# Patient Record
Sex: MALE | Race: WHITE | Employment: OTHER | URBAN - METROPOLITAN AREA
[De-identification: names, ages, dates, MRNs, and addresses within clinical notes are randomized per-mention and may not be internally consistent; named-entity substitution may affect disease eponyms.]

---

## 2022-04-08 ENCOUNTER — OFFICE VISIT (OUTPATIENT)
Dept: ORTHOPEDIC SURGERY | Age: 62
End: 2022-04-08
Payer: COMMERCIAL

## 2022-04-08 VITALS — BODY MASS INDEX: 29.66 KG/M2 | HEIGHT: 67 IN | WEIGHT: 189 LBS

## 2022-04-08 DIAGNOSIS — S46.012A STRAIN OF LEFT ROTATOR CUFF CAPSULE, INITIAL ENCOUNTER: ICD-10-CM

## 2022-04-08 DIAGNOSIS — M25.512 ACUTE PAIN OF LEFT SHOULDER: Primary | ICD-10-CM

## 2022-04-08 PROCEDURE — 99203 OFFICE O/P NEW LOW 30 MIN: CPT | Performed by: PHYSICIAN ASSISTANT

## 2022-04-08 PROCEDURE — L3660 SO 8 AB RSTR CAN/WEB PRE OTS: HCPCS | Performed by: PHYSICIAN ASSISTANT

## 2022-04-08 NOTE — PROGRESS NOTES
Chief Complaint    No chief complaint on file. History of Present Illness:  Angela Menchaca is a 64 y.o. male who presents tonight for evaluation of left shoulder injury. Patient states that last night he fell in his home landing on his outstretched left and right elbows. He had immediate pain within the left shoulder. Since the injury he has had decreased range of motion of the left upper extremity secondary to pain. Patient has a history of neuropathy and spinal issues. Patient states that the pain is mainly over the anterior superior aspect the left shoulder and increased with any range of motion. He does have neuropathy that radiates into his left hand which he states has been unchanged since the recent injury. Patient is on chronic pain medication secondary to ongoing spinal issues  Pain Assessment  Location of Pain: Shoulder  Location Modifiers: Left  Severity of Pain: 10  Quality of Pain: Aching,Sharp  Duration of Pain: Persistent  Frequency of Pain: Constant  Date Pain First Started: 04/07/22  Limiting Behavior: Yes  Result of Injury: Yes  Work-Related Injury: No  Are there other pain locations you wish to document?: No]       Medical History:  Patient's medications, allergies, past medical, surgical, social and family histories were reviewed and updated as appropriate. Review of Systems:  Relevant 12 point review of systems dated 4/8/2022 was reviewed and are available in the patient's chart under the media tab. Vital Signs: There were no vitals taken for this visit. General Exam:   Constitutional: Patient is adequately groomed with no evidence of malnutrition  DTRs: Deep tendon reflexes are intact  Mental Status: The patient is oriented to time, place and person. The patient's mood and affect are appropriate. Lymphatic: The lymphatic examination bilaterally reveals all areas to be without enlargement or induration. Vascular: Examination reveals no swelling or calf tenderness. Peripheral pulses are palpable and 2+. Neurological: The patient has adequate coordination. There is mild weakness of his left upper extremity     Left shoulder Examination:    Inspection:  No rashes, scars, or lesions. No deformity or atrophy. Palpation: Patient is diffusely tender to palpation over the anterior aspect of the left shoulder into the subacromial space. Is also tender to palpation over the [facility] joint. Range of Motion: 0 degrees of extension 90 degrees of forward flexion, 90 degrees of abduction, internal rotation only to his waist and very limited external rotation secondary to pain. Strength: Left. Biceps and triceps strength is 4/5. Right biceps strength is noted to be 5/5. Special Tests:  Positive Rosas and Neer impingement exam.  Negative speed sign. Negative crossover examination. Skin: There are no rashes, ulcerations or lesions. Gait: Antalgic      Additional Examinations:         Contralateral Exam: Examination of the right shoulder reveals no atrophy or deformity. Skin is warm and dry. Range of motion is within normal limits. There is no focal tenderness with palpation. No AC joint tenderness. Negative Neer and Rosas-Jose exams. Strength is graded 5/5 throughout. Neck: Examination of the neck does not show any tenderness, deformity or injury. Range of motion is unremarkable. There is no gross instability. There are no rashes, ulcerations or lesions. Strength and tone are normal.    Radiology:     X-rays obtained and reviewed in office:  Views 4 views including AP, Y, axillary, and internal rotation view  Location left shoulder  Impression there is a well-maintained glenohumeral joint with mild arthritic changes. No fractures or dislocations. Impression: Left rotator cuff strain with possible rotator cuff tear  Encounter Diagnosis   Name Primary?     Acute pain of left shoulder Yes       Office Procedures:  Orders Placed This Encounter   Procedures  XR SHOULDER LEFT (MIN 2 VIEWS)     Standing Status:   Future     Number of Occurrences:   1     Standing Expiration Date:   4/8/2023       Treatment Plan: Tonight we discussed the diagnosis and treatment options and the patient was placed into a sling for his comfort. He is to continue icing the left shoulder and he will follow-up next week with Dr. Sukhdeep Perez and [facility] ([facility]) for his continue evaluation care.     Maykel Pires PA-C  Board certified by the [facility] [facility]

## 2022-04-12 ENCOUNTER — OFFICE VISIT (OUTPATIENT)
Dept: ORTHOPEDIC SURGERY | Age: 62
End: 2022-04-12
Payer: COMMERCIAL

## 2022-04-12 DIAGNOSIS — S46.012A STRAIN OF LEFT ROTATOR CUFF CAPSULE, INITIAL ENCOUNTER: Primary | ICD-10-CM

## 2022-04-12 PROCEDURE — 99203 OFFICE O/P NEW LOW 30 MIN: CPT | Performed by: STUDENT IN AN ORGANIZED HEALTH CARE EDUCATION/TRAINING PROGRAM

## 2022-04-12 NOTE — PROGRESS NOTES
Date:  2022    Name:  Ruben De Jesus  Address:  [address] [place] [id number]    :  1960      Age:   64 y.o.    SSN:  [id number]      Medical Record Number:  [id number]    Reason for Visit:    Chief Complaint    Shoulder Pain (left shoulder pain; fell last Thursday; pain; Limited ROM)      DOS:2022     HPI: Ruben De Jesus is a right-hand-dominant 64 y.o. male here today for new patient evaluation regarding his left shoulder. The patient reports that he fell last week directly onto both of his arms. He noted immediate pain and weakness to the left shoulder. Over the past few days he has not gotten any better and continues to have weakness of the left shoulder to move it or to lift even light things. He denies numbness and tingling down the arm from the injury but he does have a history of neuropathy in his legs and his feet from a combination of multiple back surgeries as well as diabetes. He reports his diabetes is well controlled. He does not take any medications for his diabetes currently. The patient is on chronic pain management for his multiple back surgeries, including Percocet and diazepam.      Pain Assessment  Location of Pain: Shoulder  Location Modifiers: Left  Quality of Pain: Dull  Duration of Pain: Persistent  Frequency of Pain: Constant  Limiting Behavior: Yes  Relieving Factors: Rest  Result of Injury: Yes  Work-Related Injury: No  ROS: All systems reviewed on patient intake form. Pertinent items are noted in HPI. History reviewed. No pertinent past medical history. History reviewed. No pertinent surgical history. History reviewed. No pertinent family history.     Social History     Socioeconomic History    Marital status:      Spouse name: None    Number of children: None    Years of education: None    Highest education level: None   Occupational History    None   Tobacco Use    Smoking status: None    Smokeless tobacco: None   Substance and Sexual Activity    Alcohol use: None    Drug use: None    Sexual activity: None   Other Topics Concern    None   Social History Narrative    None     Social Determinants of Health     Financial Resource Strain:     Difficulty of Paying Living Expenses: Not on file   Food Insecurity:     Worried About Running Out of Food in the Last Year: Not on file    [place] of Food in the Last Year: Not on file   Transportation Needs:     Lack of Transportation (Medical): Not on file    Lack of Transportation (Non-Medical): Not on file   Physical Activity:     Days of Exercise per Week: Not on file    Minutes of Exercise per Session: Not on file   Stress:     Feeling of Stress : Not on file   Social Connections:     Frequency of Communication with Friends and Family: Not on file    Frequency of Social Gatherings with Friends and Family: Not on file    Attends [employer]ology Services: Not on file    Active Member of [address] [place] [address] [employer] or Organizations: Not on file    Attends Club or Organization Meetings: Not on file    Marital Status: Not on file   Intimate Partner Violence:     Fear of Current or Ex-Partner: Not on file    Emotionally Abused: Not on file    Physically Abused: Not on file    Sexually Abused: Not on file   Housing Stability:     Unable to Pay for Housing in the Last Year: Not on file    Number of [place] in the Last Year: Not on file    Unstable Housing in the Last Year: Not on file       No current outpatient medications on file. No current facility-administered medications for this visit. No Known Allergies    Vital signs: There were no vitals taken for this visit. Left shoulder exam    Inspection:  Held in a normal posture. Normal contour at the acromioclavicular joint. No swelling, ecchymosis, or erythema about the shoulder. No atrophy appreciated. No scapular winging. Palpation:  No subacromial crepitus. No tenderness of the AC joint.  No greater tuberosity tenderness. No tenderness in the bicipital groove. Range of Motion: Active forward flexion to 125 degrees, passively to 150 degrees. External rotation actively 15 degrees, passively to 45 degrees. Internal rotation to back pocket. Strength: 3 out of 5 supraspinatus strength, 4 out of 5 infraspinatus strength, 2 out of 5 subscapularis strength on belly press and lift off. Stability: No anterior instability. No posterior instability. Special Tests: Impingement findings are positive. Labral findings are negative. Speed sign and Yergason signs are both positive. Crossover sign is negative. Other findings: The skin is warm dry and well perfused. 2+ radial pulse. Sensation is intact to light touch over the deltoid. Right comparison shoulder exam    Inspection:  Held in a normal posture. Normal contour at the acromioclavicular joint. No swelling, ecchymosis, or erythema about the shoulder. No atrophy appreciated. No scapular winging. Palpation:  No subacromial crepitus. No tenderness of the AC joint. No greater tuberosity tenderness. No tenderness in the bicipital groove. Range of Motion: Full passive and active ROM. Normal scapulothoracic rhythm. Strength:  Normal supraspinatus, infraspinatus, and subscapularis muscle strength. Stability: No anterior instability. No posterior instability. Special Tests: Impingement findings are negative. Labral findings are negative. Speed sign and Yergason signs are both negative. Crossover sign is negative. Belly press sign is negative. Lift off sign is negative. Other findings: The skin is warm dry and well perfused. 2+ radial pulse. Sensation is intact to light touch over the deltoid. Diagnostics:  Radiology:       No new x-rays today.   Prior x-rays from the after-hours visit demonstrate slight arthritis of the glenohumeral joint, moderate to severe AC joint arthritis, but appropriate acromiohumeral interval and glenohumeral alignment      Assessment: 64 y.o. male with acute left shoulder pain after a fall and profound rotator cuff weakness, concern for acute rotator cuff tear    Plan: Pertinent imaging was reviewed. The etiology, natural history, and treatment options for the disorder were discussed. The roles of activity medication, antiinflammatories, injections, bracing, physical therapy, and surgical interventions were all described to the patient and questions were answered. Given the patient's recent history of injury and trauma from a fall, coupled with his physical exam findings of global weakness to his rotator cuff muscles especially subscapularis and supraspinatus, I am concerned that the patient has an acute full-thickness rotator cuff tear. I will get the patient ordered an MRI to evaluate the rotator cuff tendons. Follow-up with me after the MRI to go over the results. . Aditya Dumas is in agreement with this plan. All questions were answered to patient's satisfaction and was encouraged to call with any further questions. Nga Owen, DO  Orthopedic Surgery and Sports Medicine  4/12/2022    Orders Placed This Encounter   Procedures    MRI SHOULDER LEFT WO CONTRAST     Standing Status:   Future     Standing Expiration Date:   4/12/2023     Scheduling Instructions:      RACHELE FAUSTIN            R/O RTC TEAR.

## 2022-04-13 ENCOUNTER — TELEPHONE (OUTPATIENT)
Dept: ORTHOPEDIC SURGERY | Age: 62
End: 2022-04-13

## 2022-04-20 ENCOUNTER — HOSPITAL ENCOUNTER (OUTPATIENT)
Dept: MRI IMAGING | Age: 62
Discharge: HOME | End: 2022-04-20
Payer: COMMERCIAL

## 2022-04-20 DIAGNOSIS — S46.012A STRAIN OF LEFT ROTATOR CUFF CAPSULE, INITIAL ENCOUNTER: ICD-10-CM

## 2022-04-20 PROCEDURE — 73221 MRI JOINT UPR EXTREM W/O DYE: CPT

## 2022-05-03 ENCOUNTER — OFFICE VISIT (OUTPATIENT)
Dept: ORTHOPEDIC SURGERY | Age: 62
End: 2022-05-03
Payer: COMMERCIAL

## 2022-05-03 DIAGNOSIS — M75.22 BICEPS TENDINITIS OF LEFT UPPER EXTREMITY: ICD-10-CM

## 2022-05-03 DIAGNOSIS — S46.012A TRAUMATIC COMPLETE TEAR OF LEFT ROTATOR CUFF, INITIAL ENCOUNTER: Primary | ICD-10-CM

## 2022-05-03 DIAGNOSIS — M19.019 OSTEOARTHRITIS OF AC (ACROMIOCLAVICULAR) JOINT: ICD-10-CM

## 2022-05-03 PROCEDURE — 99215 OFFICE O/P EST HI 40 MIN: CPT | Performed by: STUDENT IN AN ORGANIZED HEALTH CARE EDUCATION/TRAINING PROGRAM

## 2022-05-03 RX ORDER — TRAZODONE HYDROCHLORIDE 150 MG/1
150 TABLET ORAL NIGHTLY
COMMUNITY

## 2022-05-03 RX ORDER — OXYCODONE AND ACETAMINOPHEN 10; 325 MG/1; MG/1
1 TABLET ORAL EVERY 4 HOURS PRN
COMMUNITY
Start: 2022-04-25

## 2022-05-03 RX ORDER — DULOXETIN HYDROCHLORIDE 60 MG/1
60 CAPSULE, DELAYED RELEASE ORAL DAILY
COMMUNITY
Start: 2022-03-18

## 2022-05-03 RX ORDER — ROSUVASTATIN CALCIUM 10 MG/1
10 TABLET, COATED ORAL DAILY
COMMUNITY
Start: 2022-04-04

## 2022-05-03 RX ORDER — BUPROPION HYDROCHLORIDE 300 MG/1
300 TABLET ORAL EVERY MORNING
COMMUNITY
Start: 2022-04-12

## 2022-05-03 RX ORDER — DIAZEPAM 5 MG/1
TABLET ORAL EVERY 8 HOURS PRN
COMMUNITY

## 2022-05-03 RX ORDER — OMEPRAZOLE 40 MG/1
40 CAPSULE, DELAYED RELEASE ORAL DAILY
COMMUNITY
Start: 2022-02-16

## 2022-05-03 NOTE — PROGRESS NOTES
Chief Complaint  Shoulder Pain (left. discuss mri results)      History of Present Illness:  Tiffanie Aquino is a pleasant 64 y.o. male here today for repeat evaluation of his left shoulder as well as going over his MRI results. He continues to have weakness and pain to his left shoulder. Prior HPI 4/12/22: The patient reports that he fell last week directly onto both of his arms. He noted immediate pain and weakness to the left shoulder. Over the past few days he has not gotten any better and continues to have weakness of the left shoulder to move it or to lift even light things. He denies numbness and tingling down the arm from the injury but he does have a history of neuropathy in his legs and his feet from a combination of multiple back surgeries as well as diabetes. He reports his diabetes is well controlled. He does not take any medications for his diabetes currently.     The patient is on chronic pain management for his multiple back surgeries, including Percocet and diazepam.         Medical History:  Patient's medications, allergies, past medical, surgical, social and family histories were reviewed and updated as appropriate. Pertinent items are noted in HPI  Review of systems reviewed from Patient History Form available in the patient's chart under the Media tab. Vital Signs: There were no vitals filed for this visit. Constitutional: In no apparent distress. Normal affect. Alert and oriented X3 and is cooperative. Left shoulder exam    The patient still has guarding to the left shoulder. He is able to actively forward flex and abduct his shoulder to 95 degrees, passively up to 130 degrees with some discomfort. Positive belly press with 3 out of 5 subscapularis strength. 4+ out of 5 empty can testing to subscapularis. 4 out of 5 infraspinatus testing. Tenderness to the anterior shoulder. Tenderness over the [facility] [place] joint      Radiology:       No new x-rays today.     MRI left shoulder 4/20/2022:  Impression   1. Large full-thickness tear of the subscapularis tendon footprint with   approximately 2 cm retraction.  Significant edema within the muscle belly   compatible with reactive edema or early denervation. 2. Small low-grade partial-thickness articular surface tear of the   infraspinatus tendon footprint and small low-grade partial-thickness   concealed intrasubstance delamination tear of the crescent. 3. Mild to moderate acromioclavicular degenerative changes. 4. Mild glenohumeral degenerative changes.                  Assessment : 70-year-old male with left shoulder acute subscapularis tear, infraspinatus tear, biceps inflammation, symptomatic AC joint arthritis    Comorbidities: Well controlled diabetes, chronic pain management for cervical and lumbar spine    Impression:  Encounter Diagnoses   Name Primary?  Traumatic complete tear of left rotator cuff, initial encounter Yes    Osteoarthritis of AC (acromioclavicular) joint     Biceps tendinitis of left upper extremity        Office Procedures:  No orders of the defined types were placed in this encounter. Plan:     Given the findings on exam as well as his MRI confirming an acute full-thickness subscapularis tear with retraction, but without atrophy, the patient meet surgical indications for left shoulder arthroscopy with rotator cuff repair. I will also perform a distal clavicle excision given his symptomatic AC joint arthritis and a subpectoral biceps tenodesis given the swelling around the biceps. Given the retraction of the subscapularis it is possible that I will have to open his shoulder and perform an open subscapularis repair with possible augmentation. The patient will need to see his PCP for surgical clearance. We will also need to develop a pain management strategy around the surgery given his pain management currently. He takes 5 Percocet 10 pills a day.   His pain management doctor is  [employer] in [place]. The patient may likely require his standard Percocet dosing including Valium and additional medications, we will communicate with his office to develop a strategy. Given the acuity of the injury and the lack of atrophy, we will get him on the books within the next month pending clearance and pain management strategy. The patient verbalized understanding. All treatment options including conservative versus surgical were discussed with the patient in detail. All questions answered appropriately. After considering all options and risk, we have decided to move forward with surgical intervention. We will plan to take to the operating room for left shoulder arthroscopy with rotator cuff repair of the subscapularis and infraspinatus, possible open subscapularis repair versus augmentation with dermal allograft, distal clavicle excision, subpectoral biceps tenodesis. Surgical risks including but not limited to: bleeding, blood clots, wound complications, infection, damage to surrounding tissues/nerves/vessels, need for further surgery, intra-operative fracture, loss of motion, stiffness, residual pain, risks of anesthesia, loss of limb and loss of life were all discussed with the patient. Knowing these risks, the patient wishes to proceed with surgery. Plans to do postoperative physical [facility] at [facility] ([facility] . Tyler Burton is in agreement with this plan. All questions were answered to patient's satisfaction and was encouraged to call with any further questions. 40 minutes were spent with the patient discussing his MRI results, his prognosis and treatment, and evaluating his shoulder as well as coming up with a plan to treat his postoperative pain. Jamaal Orr, DO  Orthopedic Surgery and Sports Medicine  5/3/2022      This dictation was performed with a verbal recognition program [facility]) and it was checked for errors.   It is possible that there are still dictated errors within this office note. If so, please bring any errors to my attention for an addendum. All efforts were made to ensure that this office note is accurate.

## 2022-05-03 NOTE — LETTER
Surgery Precert & Billing Form:    DEMOGRAPHICS:                                                                                                       Patient Name:  Diana Morris  Patient :  1960  Patient SS#:     Patient Phone: [phone] (home)  Alt.  Patient Phone:    Patient Address:  [address] [place] [address] [address] [address]  PCP:  Leonardo Dee MD  Insurance: Nilson Weeks    DIAGNOSIS & PROCEDURE:                                                                                      Diagnosis: LT SHOULDER RCT- M75.1, BICEPS TENDINITIS- M75.22  Operation: left    SURGERY  INFORMATION  Date of Surgery:   6/15/2022  Location:    [facility]  Type:    Outpatient  23 hour hold:  No  Surgeon: Mirella Strauss DO            BILLING INFORMATION:                                                                                                Physician Procedure                                            CPT Codes        LT SHOULDER VAS,RCR,SUBPECTORAL                BICEPS TENODESIS,SUBACROMINAL DECOMPRESSION  DISTAL CLAVICLE EXCISION      Precert information:    ***

## 2022-05-03 NOTE — LETTER
[address] [address]  Phone: [phone]  Fax: [phone]    Katherine Rodríguez DO    May 3, 2022     Clara Phillips MD  [address] [place] [address]    Patient: Yu Santos   MR Number: [id number]   YOB: 1960   Date of Visit: 5/3/2022       Dear Clara Phillips: Thank you for referring Yu Santos to me for evaluation/treatment. Below are the relevant portions of my assessment and plan of care. The patient is a candidate for left shoulder arthroscopy with rotator cuff repair as he has an acute cuff tendon tear from his fall last month. Any help you can offer for his pain control perioperatively would be appreciated. It sounds like he is on Percocet 10, five times daily, as well as Diazepam.        If you have questions, please do not hesitate to call me. I look forward to following Clair Villagomez along with you.     Sincerely,      Katherine Rodríguez DO (Cell: [phone])

## 2022-05-25 ENCOUNTER — TELEPHONE (OUTPATIENT)
Dept: ORTHOPEDIC SURGERY | Age: 62
End: 2022-05-25

## 2022-05-25 NOTE — TELEPHONE ENCOUNTER
CPT: 55413, 77301, 93855, 76932  BODY PART: left shoulder  STATUS: outpatient  LOCATION: [facility]  AUTHORIZATION: [employer]    Per [facility] website, [address]    No diagnosis codes on surgery letter.

## 2022-06-13 ENCOUNTER — ANESTHESIA EVENT (OUTPATIENT)
Dept: OPERATING ROOM | Age: 62
End: 2022-06-13
Payer: COMMERCIAL

## 2022-06-13 NOTE — PROGRESS NOTES

## 2022-06-13 NOTE — PROGRESS NOTES
PAT completed with patient orders placed per MD, patient states his wife Rosalind Alonzo will be his  and caregiver day of surgery. Rosalia Olivia RN

## 2022-06-14 NOTE — PROGRESS NOTES
Spoke to pt about surgery being canceled and rescheduled for 6-20-22 at 7 am. Instructed to arrive at 6 am and to ne NPO after midnight.

## 2022-06-15 ENCOUNTER — TELEPHONE (OUTPATIENT)
Dept: ORTHOPEDIC SURGERY | Age: 62
End: 2022-06-15

## 2022-06-15 ENCOUNTER — ANESTHESIA (OUTPATIENT)
Dept: OPERATING ROOM | Age: 62
End: 2022-06-15
Payer: COMMERCIAL

## 2022-06-15 NOTE — TELEPHONE ENCOUNTER
I spoke with Dr. Caryn Amaya this morning regarding Mr. Morales's upcoming surgery and medications. Dr. Caryn Amaya said that he would control all medications after surgery.

## 2022-06-20 ENCOUNTER — HOSPITAL ENCOUNTER (OUTPATIENT)
Age: 62
Setting detail: OUTPATIENT SURGERY
Discharge: HOME | End: 2022-06-20
Attending: STUDENT IN AN ORGANIZED HEALTH CARE EDUCATION/TRAINING PROGRAM | Admitting: STUDENT IN AN ORGANIZED HEALTH CARE EDUCATION/TRAINING PROGRAM
Payer: COMMERCIAL

## 2022-06-20 VITALS
RESPIRATION RATE: 10 BRPM | WEIGHT: 180 LBS | DIASTOLIC BLOOD PRESSURE: 65 MMHG | OXYGEN SATURATION: 97 % | BODY MASS INDEX: 26.66 KG/M2 | SYSTOLIC BLOOD PRESSURE: 168 MMHG | TEMPERATURE: 97 F | HEIGHT: 69 IN | HEART RATE: 56 BPM

## 2022-06-20 LAB
ANION GAP SERPL CALCULATED.3IONS-SCNC: 11 MMOL/L (ref 3–16)
BUN BLDV-MCNC: 15 MG/DL (ref 7–20)
CALCIUM SERPL-MCNC: 9.3 MG/DL (ref 8.3–10.6)
CHLORIDE BLD-SCNC: 105 MMOL/L (ref 99–110)
CO2: 27 MMOL/L (ref 21–32)
CREAT SERPL-MCNC: 0.7 MG/DL (ref 0.8–1.3)
EKG ATRIAL RATE: 58 BPM
EKG DIAGNOSIS: NORMAL
EKG P AXIS: 17 DEGREES
EKG P-R INTERVAL: 154 MS
EKG Q-T INTERVAL: 422 MS
EKG QRS DURATION: 104 MS
EKG QTC CALCULATION (BAZETT): 414 MS
EKG R AXIS: 0 DEGREES
EKG T AXIS: 44 DEGREES
EKG VENTRICULAR RATE: 58 BPM
GFR AFRICAN AMERICAN: >60
GFR NON-AFRICAN AMERICAN: >60
GLUCOSE BLD-MCNC: 93 MG/DL (ref 70–99)
GLUCOSE BLD-MCNC: 94 MG/DL (ref 70–99)
HCT VFR BLD CALC: 39.4 % (ref 40.5–52.5)
HEMOGLOBIN: 13.4 G/DL (ref 13.5–17.5)
MCH RBC QN AUTO: 30.5 PG (ref 26–34)
MCHC RBC AUTO-ENTMCNC: 34.1 G/DL (ref 31–36)
MCV RBC AUTO: 89.6 FL (ref 80–100)
PDW BLD-RTO: 12.7 % (ref 12.4–15.4)
PERFORMED ON: NORMAL
PLATELET # BLD: 120 K/UL (ref 135–450)
PMV BLD AUTO: 7.2 FL (ref 5–10.5)
POTASSIUM REFLEX MAGNESIUM: 3.8 MMOL/L (ref 3.5–5.1)
RBC # BLD: 4.4 M/UL (ref 4.2–5.9)
SODIUM BLD-SCNC: 143 MMOL/L (ref 136–145)
WBC # BLD: 6.1 K/UL (ref 4–11)

## 2022-06-20 PROCEDURE — 2580000003 HC RX 258: Performed by: NURSE ANESTHETIST, CERTIFIED REGISTERED

## 2022-06-20 PROCEDURE — 2720000010 HC SURG SUPPLY STERILE: Performed by: STUDENT IN AN ORGANIZED HEALTH CARE EDUCATION/TRAINING PROGRAM

## 2022-06-20 PROCEDURE — 6360000002 HC RX W HCPCS: Performed by: STUDENT IN AN ORGANIZED HEALTH CARE EDUCATION/TRAINING PROGRAM

## 2022-06-20 PROCEDURE — 29823 SHO ARTHRS SRG XTNSV DBRDMT: CPT | Performed by: STUDENT IN AN ORGANIZED HEALTH CARE EDUCATION/TRAINING PROGRAM

## 2022-06-20 PROCEDURE — 29828 SHO ARTHRS SRG BICP TENODSIS: CPT | Performed by: STUDENT IN AN ORGANIZED HEALTH CARE EDUCATION/TRAINING PROGRAM

## 2022-06-20 PROCEDURE — 6360000002 HC RX W HCPCS: Performed by: NURSE ANESTHETIST, CERTIFIED REGISTERED

## 2022-06-20 PROCEDURE — 29827 SHO ARTHRS SRG RT8TR CUF RPR: CPT | Performed by: STUDENT IN AN ORGANIZED HEALTH CARE EDUCATION/TRAINING PROGRAM

## 2022-06-20 PROCEDURE — 7100000001 HC PACU RECOVERY - ADDTL 15 MIN: Performed by: STUDENT IN AN ORGANIZED HEALTH CARE EDUCATION/TRAINING PROGRAM

## 2022-06-20 PROCEDURE — 23120 CLAVICULECTOMY PARTIAL: CPT | Performed by: STUDENT IN AN ORGANIZED HEALTH CARE EDUCATION/TRAINING PROGRAM

## 2022-06-20 PROCEDURE — 7100000010 HC PHASE II RECOVERY - FIRST 15 MIN: Performed by: STUDENT IN AN ORGANIZED HEALTH CARE EDUCATION/TRAINING PROGRAM

## 2022-06-20 PROCEDURE — 7100000011 HC PHASE II RECOVERY - ADDTL 15 MIN: Performed by: STUDENT IN AN ORGANIZED HEALTH CARE EDUCATION/TRAINING PROGRAM

## 2022-06-20 PROCEDURE — 2500000003 HC RX 250 WO HCPCS: Performed by: STUDENT IN AN ORGANIZED HEALTH CARE EDUCATION/TRAINING PROGRAM

## 2022-06-20 PROCEDURE — 7100000000 HC PACU RECOVERY - FIRST 15 MIN: Performed by: STUDENT IN AN ORGANIZED HEALTH CARE EDUCATION/TRAINING PROGRAM

## 2022-06-20 PROCEDURE — 3700000000 HC ANESTHESIA ATTENDED CARE: Performed by: STUDENT IN AN ORGANIZED HEALTH CARE EDUCATION/TRAINING PROGRAM

## 2022-06-20 PROCEDURE — C9290 INJ, BUPIVACAINE LIPOSOME: HCPCS | Performed by: STUDENT IN AN ORGANIZED HEALTH CARE EDUCATION/TRAINING PROGRAM

## 2022-06-20 PROCEDURE — 93010 ELECTROCARDIOGRAM REPORT: CPT | Performed by: INTERNAL MEDICINE

## 2022-06-20 PROCEDURE — 2709999900 HC NON-CHARGEABLE SUPPLY: Performed by: STUDENT IN AN ORGANIZED HEALTH CARE EDUCATION/TRAINING PROGRAM

## 2022-06-20 PROCEDURE — 2500000003 HC RX 250 WO HCPCS: Performed by: NURSE ANESTHETIST, CERTIFIED REGISTERED

## 2022-06-20 PROCEDURE — 6370000000 HC RX 637 (ALT 250 FOR IP): Performed by: ANESTHESIOLOGY

## 2022-06-20 PROCEDURE — 2580000003 HC RX 258: Performed by: STUDENT IN AN ORGANIZED HEALTH CARE EDUCATION/TRAINING PROGRAM

## 2022-06-20 PROCEDURE — 3600000004 HC SURGERY LEVEL 4 BASE: Performed by: STUDENT IN AN ORGANIZED HEALTH CARE EDUCATION/TRAINING PROGRAM

## 2022-06-20 PROCEDURE — 3600000014 HC SURGERY LEVEL 4 ADDTL 15MIN: Performed by: STUDENT IN AN ORGANIZED HEALTH CARE EDUCATION/TRAINING PROGRAM

## 2022-06-20 PROCEDURE — 80048 BASIC METABOLIC PNL TOTAL CA: CPT

## 2022-06-20 PROCEDURE — L3660 SO 8 AB RSTR CAN/WEB PRE OTS: HCPCS | Performed by: STUDENT IN AN ORGANIZED HEALTH CARE EDUCATION/TRAINING PROGRAM

## 2022-06-20 PROCEDURE — 6360000002 HC RX W HCPCS: Performed by: ANESTHESIOLOGY

## 2022-06-20 PROCEDURE — C1713 ANCHOR/SCREW BN/BN,TIS/BN: HCPCS | Performed by: STUDENT IN AN ORGANIZED HEALTH CARE EDUCATION/TRAINING PROGRAM

## 2022-06-20 PROCEDURE — 85027 COMPLETE CBC AUTOMATED: CPT

## 2022-06-20 PROCEDURE — 6360000002 HC RX W HCPCS

## 2022-06-20 PROCEDURE — 3700000001 HC ADD 15 MINUTES (ANESTHESIA): Performed by: STUDENT IN AN ORGANIZED HEALTH CARE EDUCATION/TRAINING PROGRAM

## 2022-06-20 PROCEDURE — 36415 COLL VENOUS BLD VENIPUNCTURE: CPT

## 2022-06-20 PROCEDURE — 93005 ELECTROCARDIOGRAM TRACING: CPT | Performed by: ANESTHESIOLOGY

## 2022-06-20 DEVICE — ANCHOR SFT TISS BICEPS FIBERTAK: Type: IMPLANTABLE DEVICE | Site: SHOULDER | Status: FUNCTIONAL

## 2022-06-20 RX ORDER — ROCURONIUM BROMIDE 10 MG/ML
INJECTION, SOLUTION INTRAVENOUS PRN
Status: DISCONTINUED | OUTPATIENT
Start: 2022-06-20 | End: 2022-06-20 | Stop reason: SDUPTHER

## 2022-06-20 RX ORDER — SODIUM CHLORIDE 0.9 % (FLUSH) 0.9 %
5-40 SYRINGE (ML) INJECTION PRN
Status: DISCONTINUED | OUTPATIENT
Start: 2022-06-20 | End: 2022-06-20 | Stop reason: HOSPADM

## 2022-06-20 RX ORDER — SODIUM CHLORIDE 9 MG/ML
INJECTION, SOLUTION INTRAVENOUS PRN
Status: DISCONTINUED | OUTPATIENT
Start: 2022-06-20 | End: 2022-06-20 | Stop reason: HOSPADM

## 2022-06-20 RX ORDER — APREPITANT 40 MG/1
40 CAPSULE ORAL ONCE
Status: DISCONTINUED | OUTPATIENT
Start: 2022-06-20 | End: 2022-06-20 | Stop reason: HOSPADM

## 2022-06-20 RX ORDER — MIDAZOLAM HYDROCHLORIDE 1 MG/ML
1 INJECTION INTRAMUSCULAR; INTRAVENOUS EVERY 5 MIN PRN
Status: DISCONTINUED | OUTPATIENT
Start: 2022-06-20 | End: 2022-06-20 | Stop reason: HOSPADM

## 2022-06-20 RX ORDER — LABETALOL HYDROCHLORIDE 5 MG/ML
INJECTION, SOLUTION INTRAVENOUS PRN
Status: DISCONTINUED | OUTPATIENT
Start: 2022-06-20 | End: 2022-06-20 | Stop reason: SDUPTHER

## 2022-06-20 RX ORDER — SODIUM CHLORIDE, SODIUM LACTATE, POTASSIUM CHLORIDE, CALCIUM CHLORIDE 600; 310; 30; 20 MG/100ML; MG/100ML; MG/100ML; MG/100ML
INJECTION, SOLUTION INTRAVENOUS CONTINUOUS PRN
Status: DISCONTINUED | OUTPATIENT
Start: 2022-06-20 | End: 2022-06-20 | Stop reason: SDUPTHER

## 2022-06-20 RX ORDER — PROPOFOL 10 MG/ML
INJECTION, EMULSION INTRAVENOUS PRN
Status: DISCONTINUED | OUTPATIENT
Start: 2022-06-20 | End: 2022-06-20 | Stop reason: SDUPTHER

## 2022-06-20 RX ORDER — ONDANSETRON 2 MG/ML
4 INJECTION INTRAMUSCULAR; INTRAVENOUS EVERY 10 MIN PRN
Status: DISCONTINUED | OUTPATIENT
Start: 2022-06-20 | End: 2022-06-20 | Stop reason: HOSPADM

## 2022-06-20 RX ORDER — ONDANSETRON 4 MG/1
4 TABLET, FILM COATED ORAL 3 TIMES DAILY PRN
Qty: 15 TABLET | Refills: 0 | Status: SHIPPED | OUTPATIENT
Start: 2022-06-20

## 2022-06-20 RX ORDER — SODIUM CHLORIDE 0.9 % (FLUSH) 0.9 %
5-40 SYRINGE (ML) INJECTION EVERY 12 HOURS SCHEDULED
Status: DISCONTINUED | OUTPATIENT
Start: 2022-06-20 | End: 2022-06-20 | Stop reason: HOSPADM

## 2022-06-20 RX ORDER — HYDRALAZINE HYDROCHLORIDE 20 MG/ML
5 INJECTION INTRAMUSCULAR; INTRAVENOUS
Status: DISCONTINUED | OUTPATIENT
Start: 2022-06-20 | End: 2022-06-20 | Stop reason: HOSPADM

## 2022-06-20 RX ORDER — APREPITANT 40 MG/1
CAPSULE ORAL
Status: COMPLETED
Start: 2022-06-20 | End: 2022-06-20

## 2022-06-20 RX ORDER — FENTANYL CITRATE 50 UG/ML
INJECTION, SOLUTION INTRAMUSCULAR; INTRAVENOUS PRN
Status: DISCONTINUED | OUTPATIENT
Start: 2022-06-20 | End: 2022-06-20 | Stop reason: SDUPTHER

## 2022-06-20 RX ORDER — SODIUM CHLORIDE, SODIUM LACTATE, POTASSIUM CHLORIDE, CALCIUM CHLORIDE 600; 310; 30; 20 MG/100ML; MG/100ML; MG/100ML; MG/100ML
INJECTION, SOLUTION INTRAVENOUS CONTINUOUS
Status: DISCONTINUED | OUTPATIENT
Start: 2022-06-20 | End: 2022-06-20 | Stop reason: HOSPADM

## 2022-06-20 RX ORDER — FAMOTIDINE 10 MG/ML
20 INJECTION, SOLUTION INTRAVENOUS ONCE
Status: DISCONTINUED | OUTPATIENT
Start: 2022-06-20 | End: 2022-06-20 | Stop reason: HOSPADM

## 2022-06-20 RX ORDER — POLYETHYLENE GLYCOL 3350 17 G/17G
17 POWDER, FOR SOLUTION ORAL DAILY
Qty: 510 G | Refills: 0 | Status: SHIPPED | OUTPATIENT
Start: 2022-06-20 | End: 2022-07-20

## 2022-06-20 RX ORDER — ONDANSETRON 2 MG/ML
INJECTION INTRAMUSCULAR; INTRAVENOUS PRN
Status: DISCONTINUED | OUTPATIENT
Start: 2022-06-20 | End: 2022-06-20 | Stop reason: SDUPTHER

## 2022-06-20 RX ORDER — LIDOCAINE HYDROCHLORIDE 20 MG/ML
INJECTION, SOLUTION INFILTRATION; PERINEURAL PRN
Status: DISCONTINUED | OUTPATIENT
Start: 2022-06-20 | End: 2022-06-20 | Stop reason: SDUPTHER

## 2022-06-20 RX ORDER — MEPERIDINE HYDROCHLORIDE 25 MG/ML
12.5 INJECTION INTRAMUSCULAR; INTRAVENOUS; SUBCUTANEOUS EVERY 5 MIN PRN
Status: DISCONTINUED | OUTPATIENT
Start: 2022-06-20 | End: 2022-06-20 | Stop reason: HOSPADM

## 2022-06-20 RX ORDER — MIDAZOLAM HYDROCHLORIDE 1 MG/ML
INJECTION INTRAMUSCULAR; INTRAVENOUS PRN
Status: DISCONTINUED | OUTPATIENT
Start: 2022-06-20 | End: 2022-06-20 | Stop reason: SDUPTHER

## 2022-06-20 RX ORDER — OXYCODONE HYDROCHLORIDE 5 MG/1
5 TABLET ORAL PRN
Status: COMPLETED | OUTPATIENT
Start: 2022-06-20 | End: 2022-06-20

## 2022-06-20 RX ORDER — OXYCODONE HYDROCHLORIDE 5 MG/1
10 TABLET ORAL PRN
Status: COMPLETED | OUTPATIENT
Start: 2022-06-20 | End: 2022-06-20

## 2022-06-20 RX ORDER — DIPHENHYDRAMINE HYDROCHLORIDE 50 MG/ML
12.5 INJECTION INTRAMUSCULAR; INTRAVENOUS
Status: DISCONTINUED | OUTPATIENT
Start: 2022-06-20 | End: 2022-06-20 | Stop reason: HOSPADM

## 2022-06-20 RX ORDER — SODIUM CHLORIDE 9 MG/ML
25 INJECTION, SOLUTION INTRAVENOUS PRN
Status: DISCONTINUED | OUTPATIENT
Start: 2022-06-20 | End: 2022-06-20 | Stop reason: HOSPADM

## 2022-06-20 RX ADMIN — LIDOCAINE HYDROCHLORIDE 100 MG: 20 INJECTION, SOLUTION INFILTRATION; PERINEURAL at 07:07

## 2022-06-20 RX ADMIN — SODIUM CHLORIDE, POTASSIUM CHLORIDE, SODIUM LACTATE AND CALCIUM CHLORIDE: 600; 310; 30; 20 INJECTION, SOLUTION INTRAVENOUS at 06:59

## 2022-06-20 RX ADMIN — CEFAZOLIN 2000 MG: 2 INJECTION, POWDER, FOR SOLUTION INTRAMUSCULAR; INTRAVENOUS at 06:57

## 2022-06-20 RX ADMIN — HYDROMORPHONE HYDROCHLORIDE 0.5 MG: 1 INJECTION, SOLUTION INTRAMUSCULAR; INTRAVENOUS; SUBCUTANEOUS at 11:38

## 2022-06-20 RX ADMIN — HYDROMORPHONE HYDROCHLORIDE 0.5 MG: 1 INJECTION, SOLUTION INTRAMUSCULAR; INTRAVENOUS; SUBCUTANEOUS at 11:20

## 2022-06-20 RX ADMIN — SUGAMMADEX 200 MG: 100 INJECTION, SOLUTION INTRAVENOUS at 10:48

## 2022-06-20 RX ADMIN — FENTANYL CITRATE 50 MCG: 50 INJECTION INTRAMUSCULAR; INTRAVENOUS at 07:39

## 2022-06-20 RX ADMIN — FENTANYL CITRATE 50 MCG: 50 INJECTION INTRAMUSCULAR; INTRAVENOUS at 09:33

## 2022-06-20 RX ADMIN — ROCURONIUM BROMIDE 30 MG: 10 INJECTION, SOLUTION INTRAVENOUS at 08:22

## 2022-06-20 RX ADMIN — ROCURONIUM BROMIDE 50 MG: 10 INJECTION, SOLUTION INTRAVENOUS at 07:22

## 2022-06-20 RX ADMIN — FENTANYL CITRATE 50 MCG: 50 INJECTION INTRAMUSCULAR; INTRAVENOUS at 08:30

## 2022-06-20 RX ADMIN — HYDROMORPHONE HYDROCHLORIDE 0.5 MG: 1 INJECTION, SOLUTION INTRAMUSCULAR; INTRAVENOUS; SUBCUTANEOUS at 11:29

## 2022-06-20 RX ADMIN — MIDAZOLAM 2 MG: 1 INJECTION INTRAMUSCULAR; INTRAVENOUS at 11:05

## 2022-06-20 RX ADMIN — ONDANSETRON HYDROCHLORIDE 4 MG: 2 INJECTION, SOLUTION INTRAMUSCULAR; INTRAVENOUS at 07:22

## 2022-06-20 RX ADMIN — ROCURONIUM BROMIDE 50 MG: 10 INJECTION, SOLUTION INTRAVENOUS at 07:07

## 2022-06-20 RX ADMIN — LABETALOL HYDROCHLORIDE 2.5 MG: 5 INJECTION, SOLUTION INTRAVENOUS at 09:37

## 2022-06-20 RX ADMIN — PHENYLEPHRINE HYDROCHLORIDE 200 MCG: 10 INJECTION INTRAVENOUS at 07:32

## 2022-06-20 RX ADMIN — PROPOFOL 200 MG: 10 INJECTION, EMULSION INTRAVENOUS at 07:07

## 2022-06-20 RX ADMIN — APREPITANT 40 MG: 40 CAPSULE ORAL at 06:55

## 2022-06-20 RX ADMIN — OXYCODONE 5 MG: 5 TABLET ORAL at 12:06

## 2022-06-20 RX ADMIN — ONDANSETRON HYDROCHLORIDE 4 MG: 2 INJECTION, SOLUTION INTRAMUSCULAR; INTRAVENOUS at 11:20

## 2022-06-20 RX ADMIN — HYDROMORPHONE HYDROCHLORIDE 0.5 MG: 1 INJECTION, SOLUTION INTRAMUSCULAR; INTRAVENOUS; SUBCUTANEOUS at 11:45

## 2022-06-20 RX ADMIN — FENTANYL CITRATE 50 MCG: 50 INJECTION INTRAMUSCULAR; INTRAVENOUS at 07:22

## 2022-06-20 RX ADMIN — SODIUM CHLORIDE, POTASSIUM CHLORIDE, SODIUM LACTATE AND CALCIUM CHLORIDE: 600; 310; 30; 20 INJECTION, SOLUTION INTRAVENOUS at 09:44

## 2022-06-20 RX ADMIN — ROCURONIUM BROMIDE 20 MG: 10 INJECTION, SOLUTION INTRAVENOUS at 09:59

## 2022-06-20 ASSESSMENT — PAIN DESCRIPTION - DESCRIPTORS: DESCRIPTORS: ACHING;DISCOMFORT

## 2022-06-20 ASSESSMENT — PAIN DESCRIPTION - LOCATION
LOCATION: SHOULDER

## 2022-06-20 ASSESSMENT — PAIN DESCRIPTION - ORIENTATION
ORIENTATION: LEFT

## 2022-06-20 ASSESSMENT — PAIN SCALES - GENERAL
PAINLEVEL_OUTOF10: 6
PAINLEVEL_OUTOF10: 8
PAINLEVEL_OUTOF10: 7
PAINLEVEL_OUTOF10: 7

## 2022-06-20 ASSESSMENT — PAIN - FUNCTIONAL ASSESSMENT
PAIN_FUNCTIONAL_ASSESSMENT: 0-10
PAIN_FUNCTIONAL_ASSESSMENT: PREVENTS OR INTERFERES SOME ACTIVE ACTIVITIES AND ADLS

## 2022-06-20 NOTE — BRIEF OP NOTE
Brief Postoperative Note      Patient: Dre Simon  YOB: 1960  MRN: [id number]    Date of Procedure: 6/20/2022    Pre-Op Diagnosis: ROTATOR CUFF TEAR, BICEPS TENDINITIS    Post-Op Diagnosis:   1. L shoulder large subscapularis tear with retraction  2. L shoulder biceps partial tearing and labral fraying with articular infraspinatus fraying  3. L shoulder severe acromioclavicular joint OA  4.  Extensive subacromial bursitis and scarring       Procedure(s):  LEFT SHOULDER VIDEO ARTHROSCOPY,  ROTATOR CUFF REPAIR SUBSCAPULARIS, SUBPECTORAL BICEPS TENODESIS, SUBACROMIAL DECOMPRESSION, OPEN DISTAL CLAVICLE EXCISION ([place])    Surgeon(s):  Sabino Reyes DO    Assistant:  Surgical Assistant: Ramón Mario    Anesthesia: General and local (60cc Exparel and Marcaine dilute)    Estimated Blood Loss (mL): less than 973     Complications: None    Implants:  Arthrex triple loaded 5.5 biocomposite anchor, Arthrex 1.9mm fibertak    Findings: see op note    Electronically signed by Sabino Reyes DO on 6/20/2022 at 10:39 AM

## 2022-06-20 NOTE — OP NOTE
Operative Note    Patient: Marcia Amaro  YOB: 1960  MRN: [id number]    Date of Procedure: 6/20/2022    Pre-Op Diagnosis: ROTATOR CUFF TEAR, BICEPS TENDINITIS    Post-Op Diagnosis:   1. L shoulder large subscapularis tear with retraction  2. L shoulder biceps partial tearing and labral fraying with articular infraspinatus fraying  3. L shoulder severe acromioclavicular joint OA  4. Extensive subacromial bursitis and scarring       Procedure(s):  LEFT SHOULDER VIDEO ARTHROSCOPY,  ROTATOR CUFF REPAIR SUBSCAPULARIS, SUBPECTORAL BICEPS TENODESIS, SUBACROMIAL DECOMPRESSION, OPEN DISTAL CLAVICLE EXCISION ([place])    Surgeon(s):  Rebecca Faustin DO    Assistant:  Surgical Assistant: Yaa Gordon    Anesthesia: General and local (60cc Exparel and Marcaine dilute)    Estimated Blood Loss (mL): less than 434     Complications: None    Implants:  Arthrex triple loaded 5.5 biocomposite anchor, Arthrex 1.9mm fibertak    Detailed Description of Procedure: The patient is a 61M presenting with L shoulder pain and weakness after a fall a couple of months ago. Physical exam and MRI demonstrated a full thickness subscapularis tear. Operative indications were met for L shoulder arthroscopic rotator cuff repair, distal clavicle excision given the [facility] joint arthritis, and biceps tenotomy versus tenodesis. Operative summary:  The patient was met in the preoperative area and the appropriate surgical site was marked. Written consent was obtained after discussing the risks, benefits, and alternative treatment options with the patient in detail. The patient agreed to move forward with the proposed procedure. Preoperatively the patient was given 2 g of Ancef and the patient was not given an interscalene block by the anesthesia team due to higher risk. The patient was wheeled back to the operative suite and placed on the operative table in a supine position.   General anesthesia was induced by the anesthesia team without complication. The patient was set up in a beachchair position with all bony prominences well-padded. A pillow was placed under the patient's knees to release tension of the sciatic and femoral nerves. The patient's head was secured in a neutral position. The operative extremity was then prepped and draped in normal sterile fashion. A timeout was performed with all necessary parties in the room. An 11 blade scalpel was then utilized to make a 1 cm incision in the posterior portal.  An arthroscope with a trocar was inserted into the glenohumeral joint and a diagnostic arthroscopy was performed. Diagnostic arthroscopy:  Evaluation of the glenohumeral joint demonstrated the following: Fraying of the labrum at the biceps insertion, fraying and partial tearing of the biceps tendon, extensive synovitis, full-thickness subscapularis tear with retraction and scarring, infraspinatus mild footprint fraying, grade 3 chondromalacia humeral head and glenoid. Evaluation of the subacromial space demonstrated the following: Extensive subacromial bursitis, fraying to the coracoacromial ligament, no bursal sided cuff tearing, and severe distal clavicle arthritis. Intra-articular work with extensive debridement:  Using outside in technique, an 18-gauge spinal needle was utilized to form an anterior portal in the rotator interval.  An 11 blade scalpel was utilized to make a 1 cm incision for the anterior portal.  ArthroCare and shaver were utilized to open up a small hole into the rotator interval and controlled bleeding. Both instruments were utilized to debride the labral fraying and the undersurface of the supraspinatus fraying as well as the interval tissue. The biceps tendon was then tagged with a PDS suture using an 18-gauge spinal needle. The biceps was then released from its insertion point on the superior labrum with an upbiter. This was then debrided down to a stable labral base.   The labrum was debrided circumferentially as well as any loose bodies of cartilage that were noted. Subscapularis repair:  A second anterolateral portal was established in line with the subscap. 2 cannulas were placed. The footprint of the lesser was debrided with a monty for a bleeding surface. Arthrocare and shaver were utilized to free up with superficial and deep surfaces of the subscap tendon to allow better excursion. An Arthrex 5.5 biocomposite triple loaded corkscrew anchor was inserted at the footprint. The sutures were then passed in a mattress fashion through the retracted subscap tendon and tied, returning good excursion of the subscap to the footprint. This was difficult to do given the amount of scar and bleeding synovitis in the joint. Attention was turned to the subacromial space. Distal clavicle excision:  The arthroscope was then removed from the glenohumeral joint and using the trocar and palpation was placed in the subacromial space. A 50 yard line mid lateral portal was utilized using an 18-gauge spinal needle an outside in approach. An 11 blade scalpel was utilized to make a 1.5 cm incision. An arthroscopic shaver was then inserted into the subacromial space and the bursa was debrided. The bursa was very thick and took a while to debride. An ArthroCare wand was utilized to expose the undersurface of the acromion to the [facility] joint. Severe arthritis was noted at the [facility] joint. Utilizing the anterior portal a 5.5 mm arthroscopic bur was inserted and performed a distal clavicle excision removing less than 1 cm of bone. Unfortunately the bursitis and bleeding made visualization impossible to complete the distal clavicle excision arthroscopically, and the decision was made to open. A 15-blade scalpel was used to make a longitudinal 5cm incision over the acromioclavicular joint.  The superior capsular tissue was incised in line with the acromioclavicular joint and flaps were lifted for later repair. A saw blade was used to clear out the remaining bone fragments for a clean excision. 1cm of bone was noted to be removed. The capsule was then repaired with 0-Vicryl suture. Subpectoral Biceps Tenodesis:  A subpec biceps tenodesis was chosen for treatment of the frayed and partially torn biceps. A 15-blade scalpel was utilized to make a 3cm incision in the axillary crease centered over the pec. Blunt dissection to the humerus and pec insertion was performed with finger and scissors. A hohmann was utilized to retract the pec and reveal the biceps tendon. It was noted to have extensive tenosynovitis. The groove was slightly decorticated with the serrated fibertak guide. A 1.9mm fibertak was then inserted in the groove just under the pec insertion and inferior border. Two sutures from the anchor were then passed in a sliding locking configuration and tied down to the bone. This was done at the musculotendinous junction. The remaining biceps tendon and suture tails were cut with a scalpel. The wound was copiously irrigated with saline and closed with 2-0 vicryl and 4-0 monocryl. A suprascapular block was performed through the Nevaiser's portal with marcaine/exparel dilute solution. The biceps incision was also injected for full anesthetic coverage. Skin closure: The arthroscope was then removed and any excessive fluid was removed from the subacromial space. The incisions were then closed with 2-0 Vicryl in interrupted fashion if needed as well as 4-0 Monocryl in an interrupted fashion. Skin glue was utilized over the skin. A sterile dressing consisting of gauze, ABDs, and tape was placed. Modifier 22: The scarred and retracted subscapularis as well as the extensive synovitis and underlying arthritis increased the difficulty of the case by 50% of time. This increased time did not compromise the quality of the final repair and treatment.      Postoperative plan:  The patient will be nonweightbearing to the L upper extremity in an UltraSling with a pillow. Okay to remove the sling several times a day for elbow and wrist range of motion. No motion of the shoulder until I see them next week. Percocet for pain control sent to the pharmacy. Zofran for nausea. MiraLAX for constipation. Discharge home. Follow-up with me in 1 week.       Electronically signed by Carline Pink DO on 6/20/2022 at 10:42 AM

## 2022-06-20 NOTE — ANESTHESIA POSTPROCEDURE EVALUATION
Department of Anesthesiology  Postprocedure Note    Patient: Marcia Amaro  MRN: [id number]  YOB: 1960  Date of evaluation: 6/20/2022      Procedure Summary     Date: 06/20/22 Room / Location: [address] [place] [address] / [facility]    Anesthesia Start: 7514 Anesthesia Stop: 0250    Procedure: LEFT SHOULDER VIDEO ARTHROSCOPY,  ROTATOR CUFF REPAIR, SUBPECT TENODESIS, SUBACROMIAL DECOMPRESSION, OPEN DISTAL CLAVICLE EXCISION (Left Shoulder) Diagnosis:       Traumatic tear of right rotator cuff, unspecified tear extent, initial encounter      Tendinitis      (ROTATOR CUFF TEAR, BICEPS TENDINITIS)    Surgeons: Rebecca Faustin DO Responsible Provider: Grace Wright MD    Anesthesia Type: general ASA Status: 2          Anesthesia Type: No value filed. Ravin Phase I: Ravin Score: 10    Ravin Phase II: Ravin Score: 10    Last vitals:   Vitals Value Taken Time   /73 06/20/22 1157   Temp 97 °F (36.1 °C) 06/20/22 1155   Pulse 90 06/20/22 1157   Resp 16 06/20/22 1157   SpO2 97 % 06/20/22 1157   Vitals shown include unvalidated device data.       Anesthesia Post Evaluation    Patient location during evaluation: PACU  Level of consciousness: awake  Airway patency: patent  Nausea & Vomiting: no nausea  Complications: no  Cardiovascular status: blood pressure returned to baseline  Respiratory status: acceptable  Hydration status: euvolemic

## 2022-06-20 NOTE — H&P
Orthopedic Preoperative Note      CHIEF COMPLAINT:  L shoulder pain    HISTORY OF PRESENT ILLNESS:      The patient is a 64 y.o. male with L shoulder pain after a fall. MRI demonstrated large acute subscapularis tear as well as partial tearing to the infra. Past Medical History:    Past Medical History:   Diagnosis Date    Diabetes mellitus ([address]     no longer takes medication    GERD (gastroesophageal reflux disease)     Hyperlipidemia     PONV (postoperative nausea and vomiting)        Past Surgical History:    Past Surgical History:   Procedure Laterality Date    ANKLE SURGERY Left 1992    ankle fusion    BACK SURGERY      CARPAL TUNNEL RELEASE Bilateral     NECK SURGERY  2011       Medications Prior to Admission:   Prior to Admission medications    Medication Sig Start Date End Date Taking? Authorizing Provider   buPROPion (WELLBUTRIN XL) 300 MG extended release tablet Take 300 mg by mouth every morning  4/12/22   Historical Provider, MD   diazePAM (VALIUM) 5 MG tablet Take by mouth every 8 hours as needed. Historical Provider, MD   DULoxetine (CYMBALTA) 60 MG extended release capsule Take 60 mg by mouth daily  3/18/22   Historical Provider, MD   omeprazole (PRILOSEC) 40 MG delayed release capsule Take 40 mg by mouth daily  2/16/22   Historical Provider, MD   oxyCODONE-acetaminophen (PERCOCET)  MG per tablet Take 1 tablet by mouth every 4 hours as needed.   4/25/22   Historical Provider, MD   rosuvastatin (CRESTOR) 10 MG tablet Take 10 mg by mouth daily  4/4/22   Historical Provider, MD   traZODone (DESYREL) 150 MG tablet Take 150 mg by mouth nightly    Historical Provider, MD       Allergies:    Vicodin hp [hydrocodone-acetaminophen]    Social History:   Social History     Socioeconomic History    Marital status:      Spouse name: None    Number of children: None    Years of education: None    Highest education level: None   Occupational History    None   Tobacco Use    Smoking status: Never Smoker    Smokeless tobacco: Never Used   Vaping Use    Vaping Use: Never used   Substance and Sexual Activity    Alcohol use: Never    Drug use: Yes     Types: Marijuana Shar [place])     Comment: medical card     Sexual activity: None   Other Topics Concern    None   Social History Narrative    None     Social Determinants of Health     Financial Resource Strain:     Difficulty of Paying Living Expenses: Not on file   Food Insecurity:     Worried About Running Out of Food in the Last Year: Not on file    [place] of Food in the Last Year: Not on file   Transportation Needs:     Lack of Transportation (Medical): Not on file    Lack of Transportation (Non-Medical): Not on file   Physical Activity:     Days of Exercise per Week: Not on file    Minutes of Exercise per Session: Not on file   Stress:     Feeling of Stress : Not on file   Social Connections:     Frequency of Communication with Friends and Family: Not on file    Frequency of Social Gatherings with Friends and Family: Not on file    Attends Mandaen Services: Not on file    Active Member of [address] [place] [address] or Organizations: Not on file    Attends Club or Organization Meetings: Not on file    Marital Status: Not on file   Intimate Partner Violence:     Fear of Current or Ex-Partner: Not on file    Emotionally Abused: Not on file    Physically Abused: Not on file    Sexually Abused: Not on file   Housing Stability:     Unable to Pay for Housing in the Last Year: Not on file    Number of [place] in the Last Year: Not on file    Unstable Housing in the Last Year: Not on file       Family History:  History reviewed. No pertinent family history. REVIEW OF SYSTEMS:  Review of Systems   Constitutional: Negative for fever and chills. HENT: Negative for congestion and eye pain. Eyes: Negative for blurred vision and double vision. Respiratory: Negative for cough, shortness of breath and wheezing.     Cardiovascular: Negative for chest pain and palpitations. Gastrointestinal: Negative for nausea. Negative for vomiting. Musculoskeletal: Positive for myalgias and joint pain L arm. Skin: Negative for itching and rash. Neurological: Negative for dizziness, sensory change and headaches. Psychiatric/Behavioral: Negative for depression and suicidal ideas. PHYSICAL EXAM:  Blood pressure (!) 168/65, pulse 56, temperature 97.6 °F (36.4 °C), temperature source Infrared, resp. rate 20, height 5' 9\" (1.753 m), weight 180 lb (81.6 kg), SpO2 97 %. Gen: alert and oriented  Chest: symmetric chest excursion, non labored breathing  Heart: RRR   Left upper extremity: No ecchymoses, abrasion, deformity, or lacerations. Skin intact. Tender to palpation anterior shoulder and distal clavicle. Compartments soft. Ulnar/Median/AIN/PIN motor intact. C4-T1 sensation grossly intact. Radial pulse 2+ with BCR        LABS:  Recent Labs     06/20/22  0630   WBC 6.1   HGB 13.4*   HCT 39.4*   *      K 3.8   BUN 15   CREATININE 0.7*   GLUCOSE 94          A/P: 64 y.o. male L shoulder acute subscapularis full thickness tear, partial thickness infraspinatus tear, acromioclavicular OA, biceps symptoms  - OR for L shoulder arthroscopy, rotator cuff repair, distal clavicle excision, subpec biceps tenodesis vs tenotomy, possible open cuff repair  - NPO since midnight  - site marked  - abx on hold for OR  - consent in chart  - Pain post op to be managed by his pain management doctor.     Franny Coello DO   7:09 AM 6/20/2022

## 2022-06-20 NOTE — ANESTHESIA PRE PROCEDURE
rosuvastatin (CRESTOR) 10 MG tablet Take 10 mg by mouth daily       traZODone (DESYREL) 150 MG tablet Take 150 mg by mouth nightly         Allergies: Allergies   Allergen Reactions    Vicodin Hp [Hydrocodone-Acetaminophen] Nausea And Vomiting       Problem List:  There is no problem list on file for this patient. Past Medical History:        Diagnosis Date    Diabetes mellitus ([address]     no longer takes medication    GERD (gastroesophageal reflux disease)     Hyperlipidemia     PONV (postoperative nausea and vomiting)        Past Surgical History:        Procedure Laterality Date    ANKLE SURGERY Left 1992    ankle fusion    BACK SURGERY      CARPAL TUNNEL RELEASE Bilateral     NECK SURGERY  2011       Social History:    Social History     Tobacco Use    Smoking status: Never Smoker    Smokeless tobacco: Never Used   Substance Use Topics    Alcohol use: Never                                Counseling given: Not Answered      Vital Signs (Current):   Vitals:    06/13/22 1158   Weight: 180 lb (81.6 kg)   Height: 5' 9\" (1.753 m)                                              BP Readings from Last 3 Encounters:   No data found for BP       NPO Status:                                                                                 BMI:   Wt Readings from Last 3 Encounters:   04/20/22 185 lb (83.9 kg)   04/08/22 189 lb (85.7 kg)     Body mass index is 26.58 kg/m². CBC: No results found for: WBC, RBC, HGB, HCT, MCV, RDW, PLT    CMP: No results found for: NA, K, CL, CO2, BUN, CREATININE, GFRAA, AGRATIO, LABGLOM, GLUCOSE, GLU, PROT, CALCIUM, BILITOT, ALKPHOS, AST, ALT    POC Tests: No results for input(s): POCGLU, POCNA, POCK, POCCL, POCBUN, POCHEMO, POCHCT in the last 72 hours.     Coags: No results found for: PROTIME, INR, APTT    HCG (If Applicable): No results found for: PREGTESTUR, PREGSERUM, HCG, HCGQUANT     ABGs: No results found for: PHART, PO2ART, BOI2GEQ, ATX7NOB, BEART, G2NIKRQY     Type & Screen (If Applicable):  No results found for: LABABO, LABRH    Drug/Infectious Status (If Applicable):  No results found for: HIV, HEPCAB    COVID-19 Screening (If Applicable): No results found for: COVID19        Anesthesia Evaluation  Patient summary reviewed and Nursing notes reviewed   history of anesthetic complications: PONV. Airway: Mallampati: II  TM distance: >3 FB   Neck ROM: full  Mouth opening: > = 3 FB   Dental:          Pulmonary:Negative Pulmonary ROS                              Cardiovascular:Negative CV ROS                      Neuro/Psych:   Negative Neuro/Psych ROS              GI/Hepatic/Renal: Neg GI/Hepatic/Renal ROS  (+) GERD: well controlled,      (-) liver disease and no renal disease       Endo/Other: Negative Endo/Other ROS       (-) diabetes mellitus               Abdominal:             Vascular: negative vascular ROS. Other Findings:           Anesthesia Plan      general     ASA 2     (I discussed with the patient the risks and benefits of PIV, general anesthesia, IV Narcotics, PACU. All questions were answered the patient agrees with the plan)  Induction: intravenous. MIPS: Prophylactic antiemetics administered. Anesthetic plan and risks discussed with patient. Plan discussed with CRNA.                     Arnaldo Peña MD   6/19/2022

## 2022-06-28 ENCOUNTER — OFFICE VISIT (OUTPATIENT)
Dept: ORTHOPEDIC SURGERY | Age: 62
End: 2022-06-28

## 2022-06-28 VITALS — BODY MASS INDEX: 26.66 KG/M2 | HEIGHT: 69 IN | WEIGHT: 180 LBS

## 2022-06-28 DIAGNOSIS — Z98.890 STATUS POST ROTATOR CUFF REPAIR: Primary | ICD-10-CM

## 2022-06-28 PROCEDURE — 99024 POSTOP FOLLOW-UP VISIT: CPT | Performed by: STUDENT IN AN ORGANIZED HEALTH CARE EDUCATION/TRAINING PROGRAM

## 2022-06-28 NOTE — PROGRESS NOTES
History:  Radha Rivera is a 64 y.o. male here today for his first postop evaluation regarding his left shoulder. He underwent a left shoulder arthroscopy with subscapularis repair, subpectoral biceps tenodesis, and open Zurdo procedure, date of procedure 6/20/2022. The patient is doing well. He denies any setbacks. He denies any numbness and tingling down the arm. Physical Examination:    Patient is awake, alert, and in no acute distress. Left shoulder Examination:    Inspection:  Portals healing well. No indication of infection. No drainage. No diffuse erythema. Benign without gross deformity    Palpation: Well tolerated gentle circumduction. Nontender to light touch    Range of Motion: Deferred    Strength:  Deferred    Stability: Deferred    Special Tests:  Distally neurovascularly intact      Assessment:   64 y.o. male 8 days status post left shoulder arthroscopy with subscapularis repair, subpectoral biceps tenodesis, and open Zurdo procedure, date of procedure 6/20/2022      Plan:   - Stressed importance of elbow and wrist motion in sling to prevent stiffness  - Discussed potential signs of infection to look out for, including foul-smelling discharge from the wound, fevers, redness around the incisions, and to call us with any concerns  - Wean off narcotic post op medications. - Zofran/phenergan for post op N/V if needed. - Plan for physical therapy to start per protocol: The patient will go to [facility] for therapy as it is close to where he lives. Given his large subscapularis retraction and repair, I would like him to avoid any internal rotation actively or reaching behind his back for 2 months. No external rotation past 45 degrees for 6 weeks. Okay to initiate forward flexion active assist range of motion up to 130 degrees and active assist external rotation to 30 degrees.   - Sling full-time with sling, no driving for 2 more weeks  - Follow up in 2 elia Owen, DO  Orthopedic Surgery and Sports Medicine  6/28/2022    No orders of the defined types were placed in this encounter.

## 2022-07-12 ENCOUNTER — OFFICE VISIT (OUTPATIENT)
Dept: ORTHOPEDIC SURGERY | Age: 62
End: 2022-07-12

## 2022-07-12 DIAGNOSIS — Z98.890 STATUS POST ROTATOR CUFF REPAIR: Primary | ICD-10-CM

## 2022-07-12 PROCEDURE — 99024 POSTOP FOLLOW-UP VISIT: CPT | Performed by: STUDENT IN AN ORGANIZED HEALTH CARE EDUCATION/TRAINING PROGRAM

## 2022-07-12 NOTE — PROGRESS NOTES
History:  The patient is here today for second postop evaluation for his left shoulder. The patient is doing well. He has done 1 visit with [facility] physical therapy. He has been doing some home exercises. He denies any major setbacks or pain. Prior HPI 6/28/22:  Angle Galicia is a 64 y.o. male here today for his first postop evaluation regarding his left shoulder. He underwent a left shoulder arthroscopy with subscapularis repair, subpectoral biceps tenodesis, and open Zurdo procedure, date of procedure 6/20/2022. The patient is doing well. He denies any setbacks. He denies any numbness and tingling down the arm. Physical Examination:    Patient is awake, alert, and in no acute distress. Left shoulder Examination:    Inspection:  Portals healing well. No indication of infection. No drainage. No diffuse erythema. Benign without gross deformity    Palpation: Well tolerated gentle circumduction. Nontender to light touch. Passive forward flexion to 95 degrees without pain, no stiffness felt. External rotation to 20 degrees without stiffness    Range of Motion: Deferred    Strength:  Deferred    Stability: Deferred    Special Tests:  Distally neurovascularly intact      Assessment:   64 y.o. male 3 weeks status post left shoulder arthroscopy with subscapularis repair, subpectoral biceps tenodesis, and open Zurdo procedure, date of procedure 6/20/2022      Plan:   Discontinue the sling pillow today. Given the size of his rotator cuff tear and the work I did, I would like him to maintain full-time sling wear for another 3 weeks. Follow-up in 3 weeks for recheck. Continue with physical therapy once to twice a week to maintain range of motion safely. Jose Ramon Christie, DO  Orthopedic Surgery and Sports Medicine  7/12/2022    No orders of the defined types were placed in this encounter.

## 2022-08-02 ENCOUNTER — OFFICE VISIT (OUTPATIENT)
Dept: ORTHOPEDIC SURGERY | Age: 62
End: 2022-08-02

## 2022-08-02 VITALS — BODY MASS INDEX: 26.66 KG/M2 | HEIGHT: 69 IN | WEIGHT: 180 LBS

## 2022-08-02 DIAGNOSIS — Z98.890 STATUS POST ROTATOR CUFF REPAIR: Primary | ICD-10-CM

## 2022-08-02 PROCEDURE — 99024 POSTOP FOLLOW-UP VISIT: CPT | Performed by: STUDENT IN AN ORGANIZED HEALTH CARE EDUCATION/TRAINING PROGRAM

## 2022-08-02 NOTE — PROGRESS NOTES
History:  The patient is here today for repeat evaluation for his left shoulder. He is doing well. He denies much pain to the shoulder. His motion is excellent. He continues to do therapy at [facility]. He has been compliant with sling wear. Prior HPI 7/12/2022:  The patient is here today for second postop evaluation for his left shoulder. The patient is doing well. He has done 1 visit with [facility] physical therapy. He has been doing some home exercises. He denies any major setbacks or pain. Prior HPI 6/28/22:  Angle Galicia is a 64 y.o. male here today for his first postop evaluation regarding his left shoulder. He underwent a left shoulder arthroscopy with subscapularis repair, subpectoral biceps tenodesis, and open Zurdo procedure, date of procedure 6/20/2022. The patient is doing well. He denies any setbacks. He denies any numbness and tingling down the arm. Physical Examination:    Patient is awake, alert, and in no acute distress. Left shoulder Examination:    Inspection:  Portals healing well. No indication of infection. No drainage. No diffuse erythema. Benign without gross deformity    Palpation: Well tolerated gentle circumduction. Nontender to light touch. Passive forward flexion to 120 degrees without pain, no stiffness felt. External rotation to 30 degrees without stiffness    Range of Motion: Deferred    Strength:  Deferred    Stability: Deferred    Special Tests:  Distally neurovascularly intact      Assessment:   64 y.o. male 6 weeks status post left shoulder arthroscopy with subscapularis repair, subpectoral biceps tenodesis, and open Zurdo procedure, date of procedure 6/20/2022      Plan:   Discontinue the sling entirely today. Continue with physical therapy. I emphasized the importance of avoiding injuring his rotator cuff repair over the next 6 weeks as it is at increased risk due to its healing and incorporation into the repair. Follow-up in 6 weeks for recheck. He can return to driving. Jose Ramon Christie, DO  Orthopedic Surgery and Sports Medicine  8/2/2022    No orders of the defined types were placed in this encounter.

## 2022-09-13 ENCOUNTER — OFFICE VISIT (OUTPATIENT)
Dept: ORTHOPEDIC SURGERY | Age: 62
End: 2022-09-13

## 2022-09-13 VITALS — BODY MASS INDEX: 26.66 KG/M2 | WEIGHT: 180 LBS | HEIGHT: 69 IN

## 2022-09-13 DIAGNOSIS — Z98.890 STATUS POST ROTATOR CUFF REPAIR: Primary | ICD-10-CM

## 2022-09-13 PROCEDURE — 99024 POSTOP FOLLOW-UP VISIT: CPT | Performed by: STUDENT IN AN ORGANIZED HEALTH CARE EDUCATION/TRAINING PROGRAM

## 2022-09-13 NOTE — PROGRESS NOTES
History:  The patient is here today for 3-month evaluation for his left shoulder. The patient is doing very well. He is doing a lot of home PT and doing formal PT once a week. Mary Ann Bloodjazmine He denies much issue. He is happy with his result so far. He is looking to get back to golfing    Prior HPI 8/2/2022:  The patient is here today for repeat evaluation for his left shoulder. He is doing well. He denies much pain to the shoulder. His motion is excellent. He continues to do therapy at [facility]. He has been compliant with sling wear. Prior HPI 7/12/2022:  The patient is here today for second postop evaluation for his left shoulder. The patient is doing well. He has done 1 visit with [facility] physical therapy. He has been doing some home exercises. He denies any major setbacks or pain. Prior HPI 6/28/22:  Alexa Perez is a 64 y.o. male here today for his first postop evaluation regarding his left shoulder. He underwent a left shoulder arthroscopy with subscapularis repair, subpectoral biceps tenodesis, and open Zurdo procedure, date of procedure 6/20/2022. The patient is doing well. He denies any setbacks. He denies any numbness and tingling down the arm. Physical Examination:    Patient is awake, alert, and in no acute distress. Left shoulder Examination:    Near equal range of motion and strength to his rotator cuff and shoulder motion compared to the opposite side. Nontender palpation about the shoulder. Assessment:   64 y.o. male 3 months status post left shoulder arthroscopy with subscapularis repair, subpectoral biceps tenodesis, and open Zurdo procedure, date of procedure 6/20/2022      Plan:   I recommend he can return to golfing but only use low irons and planning. No longer irons or driving for 6 months. Continue with his PT. Follow-up with me in 2 months for recheck.       Maritza Allred, DO  Orthopedic Surgery and Sports Medicine  9/13/2022    No orders of the defined types were placed in this encounter.

## 2022-11-15 ENCOUNTER — OFFICE VISIT (OUTPATIENT)
Dept: ORTHOPEDIC SURGERY | Age: 62
End: 2022-11-15
Payer: COMMERCIAL

## 2022-11-15 VITALS — HEIGHT: 69 IN | WEIGHT: 180 LBS | BODY MASS INDEX: 26.66 KG/M2

## 2022-11-15 DIAGNOSIS — Z98.890 STATUS POST ROTATOR CUFF REPAIR: Primary | ICD-10-CM

## 2022-11-15 PROCEDURE — 99212 OFFICE O/P EST SF 10 MIN: CPT | Performed by: STUDENT IN AN ORGANIZED HEALTH CARE EDUCATION/TRAINING PROGRAM

## 2022-11-15 NOTE — PROGRESS NOTES
History:  The patient is here today for his 5-month evaluation for his left shoulder. The patient is doing great. He continues to get strength. He denies much issue with the shoulder. Prior HPI 9/13/2022:  The patient is here today for 3-month evaluation for his left shoulder. The patient is doing very well. He is doing a lot of home PT and doing formal PT once a week. Aline Alexy He denies much issue. He is happy with his result so far. He is looking to get back to [employer]    Prior HPI 8/2/2022:  The patient is here today for repeat evaluation for his left shoulder. He is doing well. He denies much pain to the shoulder. His motion is excellent. He continues to do therapy at [facility]. He has been compliant with sling wear. Prior HPI 7/12/2022:  The patient is here today for second postop evaluation for his left shoulder. The patient is doing well. He has done 1 visit with [facility] physical therapy. He has been doing some home exercises. He denies any major setbacks or pain. Prior HPI 6/28/22:  Sukhjinder Marti is a 64 y.o. male here today for his first postop evaluation regarding his left shoulder. He underwent a left shoulder arthroscopy with subscapularis repair, subpectoral biceps tenodesis, and open Zurdo procedure, date of procedure 6/20/2022. The patient is doing well. He denies any setbacks. He denies any numbness and tingling down the arm. Physical Examination:    Patient is awake, alert, and in no acute distress. Left shoulder Examination:    Equal range of motion and strength to his rotator cuff and shoulder motion compared to the opposite side. Nontender palpation about the shoulder. Assessment:   64 y.o. male 5 months status post left shoulder arthroscopy with subscapularis repair, subpectoral biceps tenodesis, and open Zurdo procedure, date of procedure 6/20/2022      Plan:    At this point the patient has good range of motion and strength of the shoulder and is about 5 months out from the procedure. I have no further restrictions for her shoulder at this point. I am okay for him to slowly return to long iron and would use when playing golf. Follow-up with me in 2 months after full return of activities for final check. Maci Willingham, DO  Orthopedic Surgery and Sports Medicine  11/15/2022    No orders of the defined types were placed in this encounter.

## 2023-03-14 ENCOUNTER — OFFICE VISIT (OUTPATIENT)
Dept: ORTHOPEDIC SURGERY | Age: 63
End: 2023-03-14

## 2023-03-14 VITALS — BODY MASS INDEX: 26.66 KG/M2 | HEIGHT: 69 IN | WEIGHT: 180 LBS

## 2023-03-14 DIAGNOSIS — Z98.890 STATUS POST ROTATOR CUFF REPAIR: Primary | ICD-10-CM

## 2023-03-14 NOTE — PROGRESS NOTES
History:  The patient is here for 9-month evaluation for his left shoulder. The patient is doing very well. He has a little bit of weakness and soreness every once a while but overall his shoulder feels great. He has returned to all activities without restrictions. He is back to golfing. Prior HPI 11/15/22:  The patient is here today for his 5-month evaluation for his left shoulder. The patient is doing great. He continues to get strength. He denies much issue with the shoulder. Prior HPI 9/13/2022:  The patient is here today for 3-month evaluation for his left shoulder. The patient is doing very well. He is doing a lot of home PT and doing formal PT once a week. Lucyann Push He denies much issue. He is happy with his result so far. He is looking to get back to golfing    Prior HPI 8/2/2022:  The patient is here today for repeat evaluation for his left shoulder. He is doing well. He denies much pain to the shoulder. His motion is excellent. He continues to do therapy at [facility]. He has been compliant with sling wear. Prior HPI 7/12/2022:  The patient is here today for second postop evaluation for his left shoulder. The patient is doing well. He has done 1 visit with [facility] physical therapy. He has been doing some home exercises. He denies any major setbacks or pain. Prior HPI 6/28/22:  Félix Page is a 58 y.o. male here today for his first postop evaluation regarding his left shoulder. He underwent a left shoulder arthroscopy with subscapularis repair, subpectoral biceps tenodesis, and open Zurdo procedure, date of procedure 6/20/2022. The patient is doing well. He denies any setbacks. He denies any numbness and tingling down the arm. Physical Examination:    Patient is awake, alert, and in no acute distress. Left shoulder Examination:    Equal range of motion and strength to his rotator cuff and shoulder motion compared to the opposite side. Nontender palpation about the shoulder. Assessment:   58 y.o. male 9 months status post left shoulder arthroscopy with subscapularis repair, subpectoral biceps tenodesis, and open Zurdo procedure, date of procedure 6/20/2022      Plan:     The patient is doing excellent. He has full strength and full range of motion and has had no setbacks for the past few months. He can follow-up as needed. Ina Zepeda, DO  Orthopedic Surgery and Sports Medicine  3/14/2023    No orders of the defined types were placed in this encounter.

## 2025-09-02 ENCOUNTER — OFFICE VISIT (OUTPATIENT)
Dept: ORTHOPEDIC SURGERY | Age: 65
End: 2025-09-02

## 2025-09-02 VITALS — BODY MASS INDEX: 27.15 KG/M2 | HEIGHT: 67 IN | WEIGHT: 173 LBS

## 2025-09-02 DIAGNOSIS — S46.011A TRAUMATIC COMPLETE TEAR OF RIGHT ROTATOR CUFF, INITIAL ENCOUNTER: Primary | ICD-10-CM

## 2025-09-02 DIAGNOSIS — M19.019 OSTEOARTHRITIS OF AC (ACROMIOCLAVICULAR) JOINT: ICD-10-CM

## (undated) DEVICE — SUTURE VCRL + SZ 0 L27IN ABSRB UD L36MM CT-1 1/2 CIR VCPP41D

## (undated) DEVICE — POUCH STRL SELF-SEAL 5.25X10IN

## (undated) DEVICE — 4.5 MM INCISOR PLUS STRAIGHT                                    BLADES, POWER/EP-1, VIOLET, PACKAGED                                    6 PER BOX, STERILE

## (undated) DEVICE — ABDOMINAL PAD: Brand: DERMACEA

## (undated) DEVICE — SUTURE PDS II SZ 0 L27IN ABSRB VLT L26MM CT-2 1/2 CIR Z334H

## (undated) DEVICE — WEREWOLF FLOW 90 COBLATION WAND: Brand: COBLATION

## (undated) DEVICE — SUTURE ETHLN SZ 3-0 L30IN NONABSORBABLE BLK FSL L30MM 3/8 1671H

## (undated) DEVICE — PRECISION THIN (9.0 X 0.38 X 18.5MM)

## (undated) DEVICE — CHLORAPREP 26ML ORANGE

## (undated) DEVICE — PENCIL ES CRD L10FT HND SWCHING ROCK SWCH W/ EDGE COAT BLDE

## (undated) DEVICE — POUCH STRL SELF-SEAL 3.5X9IN

## (undated) DEVICE — GAUZE,SPONGE,4"X4",8PLY,STRL,LF,10/TRAY: Brand: MEDLINE

## (undated) DEVICE — MAT TRACK 40 X 72 IN ABSORBENT

## (undated) DEVICE — 3M™ STERI-DRAPE™ U-DRAPE 1015: Brand: STERI-DRAPE™

## (undated) DEVICE — STOCKINETTE,IMPERVIOUS,12X48,STERILE: Brand: MEDLINE

## (undated) DEVICE — 5.5 MM ELITE ACROMIOBLASTER                                    STRAIGHT DISPOSABLE BURRS, BRICK                                    RED, 10000 MAXIMUM RPM, PACKAGED 6                                    PER BOX, STERILE

## (undated) DEVICE — 3M™ IOBAN™ 2 ANTIMICROBIAL INCISE DRAPE 6650EZ: Brand: IOBAN™ 2

## (undated) DEVICE — SHOULDER STABILIZATION KIT,                                    DISPOSABLE 12 PER BOX

## (undated) DEVICE — TUBING, SUCTION, 3/16" X 10', STRAIGHT: Brand: MEDLINE

## (undated) DEVICE — SUTURE VCRL + SZ 3-0 L27IN ABSRB UD L26MM SH 1/2 CIR VCP416H

## (undated) DEVICE — SUTURE VCRL SZ 2-0 L18IN ABSRB VLT L26MM SH 1/2 CIR J775D

## (undated) DEVICE — DRAPE,U/SHT,SPLIT,FILM,60X84,STERILE: Brand: MEDLINE

## (undated) DEVICE — 3M™ COBAN™ NL STERILE NON-LATEX SELF-ADHERENT WRAP, 2084S, 4 IN X 5 YD (10 CM X 4,5 M), 18 ROLLS/CASE: Brand: 3M™ COBAN™

## (undated) DEVICE — NEEDLE SPNL L3.5IN PNK HUB S STL REG WALL FIT STYL W/ QNCKE

## (undated) DEVICE — SUTURE MCRYL + SZ 4-0 L18IN ABSRB UD L19MM PS-2 3/8 CIR MCP496G

## (undated) DEVICE — GOWN SIRUS NONREIN XL W/TWL: Brand: MEDLINE INDUSTRIES, INC.

## (undated) DEVICE — SLING ARM M FOR 11-13IN UNIV PREM QUAL BRTH EXTRA PD FAB W/

## (undated) DEVICE — SOLUTION IRRIG 500ML 0.9% SOD CHL USP POUR PLAS BTL

## (undated) DEVICE — COVER LT HNDL PLAS RIG 1 PER PK

## (undated) DEVICE — FIRSTPASS NEEDLE AND SUTURE                                    CAPTURE, 5 PER BOX: Brand: FIRSTPASS

## (undated) DEVICE — SUTURE VCRL + SZ 2-0 L27IN ABSRB WHT SH 1/2 CIR TAPERCUT VCP417H

## (undated) DEVICE — GLOVE ORANGE PI 7 1/2   MSG9075

## (undated) DEVICE — MAJOR SET UP PK

## (undated) DEVICE — AMBIENT SUPER MULTIVAC 50 WITH                                    INTEGRATED FINGER SWITCHES IFS: Brand: COBLATION

## (undated) DEVICE — SOLUTION IRRIG 3000ML 0.9% SOD CHL USP UROMATIC PLAS CONT

## (undated) DEVICE — 1010 S-DRAPE TOWEL DRAPE 10/BX: Brand: STERI-DRAPE™

## (undated) DEVICE — CANNULA THREADED FLEX 8.0 X 72MM: Brand: CLEAR-TRAC

## (undated) DEVICE — POUCH STRL SELF-SEAL 7.5X13IN

## (undated) DEVICE — PAD ABSRB W8XL10IN ABD HYDROPHOBIC NONWOVEN THCK LAYR CELOS

## (undated) DEVICE — DRAPE,REIN 53X77,STERILE: Brand: MEDLINE